# Patient Record
Sex: FEMALE | Race: WHITE | NOT HISPANIC OR LATINO | Employment: OTHER | URBAN - NONMETROPOLITAN AREA
[De-identification: names, ages, dates, MRNs, and addresses within clinical notes are randomized per-mention and may not be internally consistent; named-entity substitution may affect disease eponyms.]

---

## 2017-03-21 ENCOUNTER — TRANSCRIBE ORDERS (OUTPATIENT)
Dept: ADMINISTRATIVE | Facility: HOSPITAL | Age: 67
End: 2017-03-21

## 2017-03-21 DIAGNOSIS — Z12.31 VISIT FOR SCREENING MAMMOGRAM: Primary | ICD-10-CM

## 2017-04-03 ENCOUNTER — HOSPITAL ENCOUNTER (OUTPATIENT)
Dept: MAMMOGRAPHY | Facility: HOSPITAL | Age: 67
Discharge: HOME | End: 2017-04-03
Attending: INTERNAL MEDICINE | Admitting: INTERNAL MEDICINE

## 2017-04-03 DIAGNOSIS — Z12.31 VISIT FOR SCREENING MAMMOGRAM: ICD-10-CM

## 2017-04-03 PROCEDURE — 77063 BREAST TOMOSYNTHESIS BI: CPT | Performed by: RADIOLOGY

## 2017-04-03 PROCEDURE — 77063 BREAST TOMOSYNTHESIS BI: CPT

## 2017-04-03 PROCEDURE — G0202 SCR MAMMO BI INCL CAD: HCPCS

## 2017-04-03 PROCEDURE — G0202 SCR MAMMO BI INCL CAD: HCPCS | Performed by: RADIOLOGY

## 2017-09-15 ENCOUNTER — HOSPITAL ENCOUNTER (OUTPATIENT)
Dept: RESPIRATORY THERAPY | Facility: HOSPITAL | Age: 67
Discharge: HOME | End: 2017-09-15
Attending: INTERNAL MEDICINE

## 2017-09-15 ENCOUNTER — HOSPITAL ENCOUNTER (OUTPATIENT)
Dept: GENERAL RADIOLOGY | Facility: HOSPITAL | Age: 67
Discharge: HOME | End: 2017-09-15
Attending: INTERNAL MEDICINE | Admitting: INTERNAL MEDICINE

## 2017-09-15 ENCOUNTER — LAB (OUTPATIENT)
Dept: LAB | Facility: HOSPITAL | Age: 67
End: 2017-09-15
Attending: INTERNAL MEDICINE

## 2017-09-15 ENCOUNTER — TRANSCRIBE ORDERS (OUTPATIENT)
Dept: ADMINISTRATIVE | Facility: HOSPITAL | Age: 67
End: 2017-09-15

## 2017-09-15 DIAGNOSIS — Z01.818 PREOP EXAMINATION: ICD-10-CM

## 2017-09-15 DIAGNOSIS — Z01.818 PREOP EXAMINATION: Primary | ICD-10-CM

## 2017-09-15 LAB
ALBUMIN SERPL-MCNC: 4.4 G/DL (ref 3.4–4.8)
ALBUMIN/GLOB SERPL: 1.8 G/DL (ref 1.5–2.5)
ALP SERPL-CCNC: 146 U/L (ref 35–104)
ALT SERPL W P-5'-P-CCNC: 21 U/L (ref 10–36)
ANION GAP SERPL CALCULATED.3IONS-SCNC: 6.4 MMOL/L (ref 3.6–11.2)
AST SERPL-CCNC: 19 U/L (ref 10–30)
BASOPHILS # BLD AUTO: 0.04 10*3/MM3 (ref 0–0.3)
BASOPHILS NFR BLD AUTO: 0.7 % (ref 0–2)
BILIRUB SERPL-MCNC: 0.3 MG/DL (ref 0.2–1.8)
BUN BLD-MCNC: 11 MG/DL (ref 7–21)
BUN/CREAT SERPL: 14.3 (ref 7–25)
CALCIUM SPEC-SCNC: 9.1 MG/DL (ref 7.7–10)
CHLORIDE SERPL-SCNC: 111 MMOL/L (ref 99–112)
CO2 SERPL-SCNC: 25.6 MMOL/L (ref 24.3–31.9)
CREAT BLD-MCNC: 0.77 MG/DL (ref 0.43–1.29)
DEPRECATED RDW RBC AUTO: 47.2 FL (ref 37–54)
EOSINOPHIL # BLD AUTO: 0.15 10*3/MM3 (ref 0–0.7)
EOSINOPHIL NFR BLD AUTO: 2.6 % (ref 0–7)
ERYTHROCYTE [DISTWIDTH] IN BLOOD BY AUTOMATED COUNT: 15.7 % (ref 11.5–14.5)
GFR SERPL CREATININE-BSD FRML MDRD: 75 ML/MIN/1.73
GLOBULIN UR ELPH-MCNC: 2.4 GM/DL
GLUCOSE BLD-MCNC: 85 MG/DL (ref 70–110)
HCT VFR BLD AUTO: 36.6 % (ref 37–47)
HGB BLD-MCNC: 11.5 G/DL (ref 12–16)
IMM GRANULOCYTES # BLD: 0.01 10*3/MM3 (ref 0–0.03)
IMM GRANULOCYTES NFR BLD: 0.2 % (ref 0–0.5)
LYMPHOCYTES # BLD AUTO: 1.78 10*3/MM3 (ref 1–3)
LYMPHOCYTES NFR BLD AUTO: 30.8 % (ref 16–46)
MCH RBC QN AUTO: 25.9 PG (ref 27–33)
MCHC RBC AUTO-ENTMCNC: 31.4 G/DL (ref 33–37)
MCV RBC AUTO: 82.4 FL (ref 80–94)
MONOCYTES # BLD AUTO: 0.39 10*3/MM3 (ref 0.1–0.9)
MONOCYTES NFR BLD AUTO: 6.7 % (ref 0–12)
NEUTROPHILS # BLD AUTO: 3.41 10*3/MM3 (ref 1.4–6.5)
NEUTROPHILS NFR BLD AUTO: 59 % (ref 40–75)
OSMOLALITY SERPL CALC.SUM OF ELEC: 283.6 MOSM/KG (ref 273–305)
PLATELET # BLD AUTO: 255 10*3/MM3 (ref 130–400)
PMV BLD AUTO: 9.8 FL (ref 6–10)
POTASSIUM BLD-SCNC: 4.2 MMOL/L (ref 3.5–5.3)
PROT SERPL-MCNC: 6.8 G/DL (ref 6–8)
RBC # BLD AUTO: 4.44 10*6/MM3 (ref 4.2–5.4)
SODIUM BLD-SCNC: 143 MMOL/L (ref 135–153)
WBC NRBC COR # BLD: 5.78 10*3/MM3 (ref 4.5–12.5)

## 2017-09-15 PROCEDURE — 36415 COLL VENOUS BLD VENIPUNCTURE: CPT

## 2017-09-15 PROCEDURE — 93010 ELECTROCARDIOGRAM REPORT: CPT | Performed by: INTERNAL MEDICINE

## 2017-09-15 PROCEDURE — 80053 COMPREHEN METABOLIC PANEL: CPT | Performed by: INTERNAL MEDICINE

## 2017-09-15 PROCEDURE — 71020 HC CHEST PA AND LATERAL: CPT

## 2017-09-15 PROCEDURE — 71020 XR CHEST PA AND LATERAL: CPT | Performed by: RADIOLOGY

## 2017-09-15 PROCEDURE — 85025 COMPLETE CBC W/AUTO DIFF WBC: CPT | Performed by: INTERNAL MEDICINE

## 2017-09-15 PROCEDURE — 93005 ELECTROCARDIOGRAM TRACING: CPT | Performed by: INTERNAL MEDICINE

## 2017-09-22 ENCOUNTER — HOSPITAL ENCOUNTER (OUTPATIENT)
Dept: GENERAL RADIOLOGY | Facility: HOSPITAL | Age: 67
Discharge: HOME | End: 2017-09-22
Attending: INTERNAL MEDICINE | Admitting: INTERNAL MEDICINE

## 2017-09-22 ENCOUNTER — TRANSCRIBE ORDERS (OUTPATIENT)
Dept: ADMINISTRATIVE | Facility: HOSPITAL | Age: 67
End: 2017-09-22

## 2017-09-22 DIAGNOSIS — J18.9 UNRESOLVED PNEUMONIA: Primary | ICD-10-CM

## 2017-09-22 DIAGNOSIS — J18.9 UNRESOLVED PNEUMONIA: ICD-10-CM

## 2017-09-22 PROCEDURE — 71020 HC CHEST PA AND LATERAL: CPT

## 2017-09-22 PROCEDURE — 71020 XR CHEST PA AND LATERAL: CPT | Performed by: RADIOLOGY

## 2017-10-14 ENCOUNTER — TRANSCRIBE ORDERS (OUTPATIENT)
Dept: LAB | Facility: HOSPITAL | Age: 67
End: 2017-10-14

## 2017-10-14 ENCOUNTER — LAB (OUTPATIENT)
Dept: LAB | Facility: HOSPITAL | Age: 67
End: 2017-10-14

## 2017-10-14 DIAGNOSIS — Z79.899 NEED FOR PROPHYLACTIC CHEMOTHERAPY: ICD-10-CM

## 2017-10-14 DIAGNOSIS — R42 DIZZINESS AND GIDDINESS: Primary | ICD-10-CM

## 2017-10-14 DIAGNOSIS — R42 DIZZINESS AND GIDDINESS: ICD-10-CM

## 2017-10-14 LAB
ALBUMIN SERPL-MCNC: 4.6 G/DL (ref 3.4–4.8)
ALBUMIN/GLOB SERPL: 1.5 G/DL (ref 1.5–2.5)
ALP SERPL-CCNC: 180 U/L (ref 35–104)
ALT SERPL W P-5'-P-CCNC: 17 U/L (ref 10–36)
ANION GAP SERPL CALCULATED.3IONS-SCNC: 6.1 MMOL/L (ref 3.6–11.2)
AST SERPL-CCNC: 17 U/L (ref 10–30)
BASOPHILS # BLD AUTO: 0.04 10*3/MM3 (ref 0–0.3)
BASOPHILS NFR BLD AUTO: 0.6 % (ref 0–2)
BILIRUB SERPL-MCNC: 0.3 MG/DL (ref 0.2–1.8)
BUN BLD-MCNC: 12 MG/DL (ref 7–21)
BUN/CREAT SERPL: 12.9 (ref 7–25)
CALCIUM SPEC-SCNC: 9.4 MG/DL (ref 7.7–10)
CHLORIDE SERPL-SCNC: 105 MMOL/L (ref 99–112)
CO2 SERPL-SCNC: 26.9 MMOL/L (ref 24.3–31.9)
CREAT BLD-MCNC: 0.93 MG/DL (ref 0.43–1.29)
DEPRECATED RDW RBC AUTO: 44.1 FL (ref 37–54)
EOSINOPHIL # BLD AUTO: 0.15 10*3/MM3 (ref 0–0.7)
EOSINOPHIL NFR BLD AUTO: 2.1 % (ref 0–7)
ERYTHROCYTE [DISTWIDTH] IN BLOOD BY AUTOMATED COUNT: 14.7 % (ref 11.5–14.5)
GFR SERPL CREATININE-BSD FRML MDRD: 60 ML/MIN/1.73
GLOBULIN UR ELPH-MCNC: 3 GM/DL
GLUCOSE BLD-MCNC: 116 MG/DL (ref 70–110)
HCT VFR BLD AUTO: 41.8 % (ref 37–47)
HGB BLD-MCNC: 13.4 G/DL (ref 12–16)
IMM GRANULOCYTES # BLD: 0.02 10*3/MM3 (ref 0–0.03)
IMM GRANULOCYTES NFR BLD: 0.3 % (ref 0–0.5)
LYMPHOCYTES # BLD AUTO: 2.61 10*3/MM3 (ref 1–3)
LYMPHOCYTES NFR BLD AUTO: 36 % (ref 16–46)
MCH RBC QN AUTO: 26.5 PG (ref 27–33)
MCHC RBC AUTO-ENTMCNC: 32.1 G/DL (ref 33–37)
MCV RBC AUTO: 82.8 FL (ref 80–94)
MONOCYTES # BLD AUTO: 0.52 10*3/MM3 (ref 0.1–0.9)
MONOCYTES NFR BLD AUTO: 7.2 % (ref 0–12)
NEUTROPHILS # BLD AUTO: 3.9 10*3/MM3 (ref 1.4–6.5)
NEUTROPHILS NFR BLD AUTO: 53.8 % (ref 40–75)
OSMOLALITY SERPL CALC.SUM OF ELEC: 276.4 MOSM/KG (ref 273–305)
PLATELET # BLD AUTO: 286 10*3/MM3 (ref 130–400)
PMV BLD AUTO: 9.7 FL (ref 6–10)
POTASSIUM BLD-SCNC: 3.7 MMOL/L (ref 3.5–5.3)
PROT SERPL-MCNC: 7.6 G/DL (ref 6–8)
RBC # BLD AUTO: 5.05 10*6/MM3 (ref 4.2–5.4)
SODIUM BLD-SCNC: 138 MMOL/L (ref 135–153)
WBC NRBC COR # BLD: 7.24 10*3/MM3 (ref 4.5–12.5)

## 2017-10-14 PROCEDURE — 85025 COMPLETE CBC W/AUTO DIFF WBC: CPT | Performed by: NURSE PRACTITIONER

## 2017-10-14 PROCEDURE — 80053 COMPREHEN METABOLIC PANEL: CPT | Performed by: NURSE PRACTITIONER

## 2017-10-14 PROCEDURE — 36415 COLL VENOUS BLD VENIPUNCTURE: CPT

## 2018-05-17 ENCOUNTER — TRANSCRIBE ORDERS (OUTPATIENT)
Dept: PULMONOLOGY | Facility: HOSPITAL | Age: 68
End: 2018-05-17

## 2018-05-17 DIAGNOSIS — R11.2 NAUSEA AND VOMITING, INTRACTABILITY OF VOMITING NOT SPECIFIED, UNSPECIFIED VOMITING TYPE: ICD-10-CM

## 2018-05-17 DIAGNOSIS — R10.9 ABDOMINAL PAIN OF UNKNOWN ETIOLOGY: Primary | ICD-10-CM

## 2018-05-18 ENCOUNTER — HOSPITAL ENCOUNTER (OUTPATIENT)
Dept: ULTRASOUND IMAGING | Facility: HOSPITAL | Age: 68
Discharge: HOME | End: 2018-05-18
Admitting: INTERNAL MEDICINE

## 2018-05-18 DIAGNOSIS — R10.9 ABDOMINAL PAIN OF UNKNOWN ETIOLOGY: ICD-10-CM

## 2018-05-18 DIAGNOSIS — R11.2 NAUSEA AND VOMITING, INTRACTABILITY OF VOMITING NOT SPECIFIED, UNSPECIFIED VOMITING TYPE: ICD-10-CM

## 2018-05-18 PROCEDURE — 76705 ECHO EXAM OF ABDOMEN: CPT

## 2018-05-18 PROCEDURE — 76705 ECHO EXAM OF ABDOMEN: CPT | Performed by: RADIOLOGY

## 2018-05-23 ENCOUNTER — TRANSCRIBE ORDERS (OUTPATIENT)
Dept: PULMONOLOGY | Facility: HOSPITAL | Age: 68
End: 2018-05-23

## 2018-05-23 DIAGNOSIS — K76.9 LIVER LESION: Primary | ICD-10-CM

## 2018-06-06 ENCOUNTER — HOSPITAL ENCOUNTER (OUTPATIENT)
Dept: CT IMAGING | Facility: HOSPITAL | Age: 68
Discharge: HOME | End: 2018-06-06
Admitting: INTERNAL MEDICINE

## 2018-06-06 DIAGNOSIS — K76.9 LIVER LESION: ICD-10-CM

## 2018-06-06 LAB — CREAT BLDA-MCNC: 0.9 MG/DL (ref 0.6–1.3)

## 2018-06-06 PROCEDURE — 25010000002 IOPAMIDOL 61 % SOLUTION: Performed by: INTERNAL MEDICINE

## 2018-06-06 PROCEDURE — 74170 CT ABD WO CNTRST FLWD CNTRST: CPT

## 2018-06-06 PROCEDURE — 74170 CT ABD WO CNTRST FLWD CNTRST: CPT | Performed by: RADIOLOGY

## 2018-06-06 PROCEDURE — 82565 ASSAY OF CREATININE: CPT

## 2018-06-06 RX ADMIN — IOPAMIDOL 100 ML: 612 INJECTION, SOLUTION INTRAVENOUS at 10:45

## 2018-11-01 ENCOUNTER — HOSPITAL ENCOUNTER (OUTPATIENT)
Dept: MAMMOGRAPHY | Facility: HOSPITAL | Age: 68
Discharge: HOME | End: 2018-11-01
Admitting: INTERNAL MEDICINE

## 2018-11-01 DIAGNOSIS — Z12.39 SCREENING BREAST EXAMINATION: ICD-10-CM

## 2018-11-01 PROCEDURE — 77063 BREAST TOMOSYNTHESIS BI: CPT

## 2018-11-01 PROCEDURE — 77067 SCR MAMMO BI INCL CAD: CPT

## 2018-11-01 PROCEDURE — 77063 BREAST TOMOSYNTHESIS BI: CPT | Performed by: RADIOLOGY

## 2018-11-01 PROCEDURE — 77067 SCR MAMMO BI INCL CAD: CPT | Performed by: RADIOLOGY

## 2020-09-18 ENCOUNTER — HOSPITAL ENCOUNTER (OUTPATIENT)
Dept: BONE DENSITY | Facility: HOSPITAL | Age: 70
Discharge: HOME | End: 2020-09-18

## 2020-09-18 ENCOUNTER — HOSPITAL ENCOUNTER (OUTPATIENT)
Dept: MAMMOGRAPHY | Facility: HOSPITAL | Age: 70
Discharge: HOME | End: 2020-09-18

## 2020-09-18 DIAGNOSIS — Z78.0 POSTMENOPAUSAL: ICD-10-CM

## 2020-09-18 DIAGNOSIS — Z12.31 VISIT FOR SCREENING MAMMOGRAM: ICD-10-CM

## 2020-09-18 PROCEDURE — 77063 BREAST TOMOSYNTHESIS BI: CPT

## 2020-09-18 PROCEDURE — 77080 DXA BONE DENSITY AXIAL: CPT | Performed by: RADIOLOGY

## 2020-09-18 PROCEDURE — 77067 SCR MAMMO BI INCL CAD: CPT | Performed by: RADIOLOGY

## 2020-09-18 PROCEDURE — 77080 DXA BONE DENSITY AXIAL: CPT

## 2020-09-18 PROCEDURE — 77063 BREAST TOMOSYNTHESIS BI: CPT | Performed by: RADIOLOGY

## 2020-09-18 PROCEDURE — 77067 SCR MAMMO BI INCL CAD: CPT

## 2021-01-12 ENCOUNTER — IMMUNIZATION (OUTPATIENT)
Dept: VACCINE CLINIC | Facility: HOSPITAL | Age: 71
End: 2021-01-12

## 2021-01-12 PROCEDURE — 91300 HC SARSCOV02 VAC 30MCG/0.3ML IM: CPT | Performed by: FAMILY MEDICINE

## 2021-01-12 PROCEDURE — 0001A: CPT | Performed by: FAMILY MEDICINE

## 2021-02-02 ENCOUNTER — IMMUNIZATION (OUTPATIENT)
Dept: VACCINE CLINIC | Facility: HOSPITAL | Age: 71
End: 2021-02-02

## 2021-02-02 PROCEDURE — 91300 HC SARSCOV02 VAC 30MCG/0.3ML IM: CPT | Performed by: FAMILY MEDICINE

## 2021-02-02 PROCEDURE — 0002A: CPT | Performed by: FAMILY MEDICINE

## 2021-10-01 ENCOUNTER — HOSPITAL ENCOUNTER (OUTPATIENT)
Dept: MAMMOGRAPHY | Facility: HOSPITAL | Age: 71
Discharge: HOME | End: 2021-10-01
Admitting: INTERNAL MEDICINE

## 2021-10-01 DIAGNOSIS — Z12.31 VISIT FOR SCREENING MAMMOGRAM: ICD-10-CM

## 2021-10-01 PROCEDURE — 77063 BREAST TOMOSYNTHESIS BI: CPT | Performed by: RADIOLOGY

## 2021-10-01 PROCEDURE — 77067 SCR MAMMO BI INCL CAD: CPT | Performed by: RADIOLOGY

## 2021-10-01 PROCEDURE — 77067 SCR MAMMO BI INCL CAD: CPT

## 2021-10-01 PROCEDURE — 77063 BREAST TOMOSYNTHESIS BI: CPT

## 2022-09-07 ENCOUNTER — TRANSCRIBE ORDERS (OUTPATIENT)
Dept: ADMINISTRATIVE | Facility: HOSPITAL | Age: 72
End: 2022-09-07

## 2022-09-07 DIAGNOSIS — M54.9 BACK PAIN WITH RADIATION: Primary | ICD-10-CM

## 2022-09-23 ENCOUNTER — HOSPITAL ENCOUNTER (OUTPATIENT)
Dept: MRI IMAGING | Facility: HOSPITAL | Age: 72
Discharge: HOME | End: 2022-09-23
Admitting: INTERNAL MEDICINE

## 2022-09-23 DIAGNOSIS — M54.9 BACK PAIN WITH RADIATION: ICD-10-CM

## 2022-09-23 PROCEDURE — 72148 MRI LUMBAR SPINE W/O DYE: CPT | Performed by: RADIOLOGY

## 2022-09-23 PROCEDURE — 72148 MRI LUMBAR SPINE W/O DYE: CPT

## 2022-12-02 ENCOUNTER — APPOINTMENT (OUTPATIENT)
Dept: MAMMOGRAPHY | Facility: HOSPITAL | Age: 72
End: 2022-12-02

## 2023-01-06 ENCOUNTER — HOSPITAL ENCOUNTER (OUTPATIENT)
Dept: MAMMOGRAPHY | Facility: HOSPITAL | Age: 73
Discharge: HOME | End: 2023-01-06
Admitting: INTERNAL MEDICINE
Payer: MEDICARE

## 2023-01-06 DIAGNOSIS — Z12.31 VISIT FOR SCREENING MAMMOGRAM: ICD-10-CM

## 2023-01-06 PROCEDURE — 77063 BREAST TOMOSYNTHESIS BI: CPT

## 2023-01-06 PROCEDURE — 77063 BREAST TOMOSYNTHESIS BI: CPT | Performed by: RADIOLOGY

## 2023-01-06 PROCEDURE — 77067 SCR MAMMO BI INCL CAD: CPT

## 2023-01-06 PROCEDURE — 77067 SCR MAMMO BI INCL CAD: CPT | Performed by: RADIOLOGY

## 2024-02-26 ENCOUNTER — HOSPITAL ENCOUNTER (OUTPATIENT)
Dept: MAMMOGRAPHY | Facility: HOSPITAL | Age: 74
Discharge: HOME | End: 2024-02-26
Admitting: INTERNAL MEDICINE
Payer: MEDICARE

## 2024-02-26 DIAGNOSIS — Z12.31 VISIT FOR SCREENING MAMMOGRAM: ICD-10-CM

## 2024-02-26 PROCEDURE — 77067 SCR MAMMO BI INCL CAD: CPT | Performed by: RADIOLOGY

## 2024-02-26 PROCEDURE — 77067 SCR MAMMO BI INCL CAD: CPT

## 2024-02-26 PROCEDURE — 77063 BREAST TOMOSYNTHESIS BI: CPT | Performed by: RADIOLOGY

## 2024-02-26 PROCEDURE — 77063 BREAST TOMOSYNTHESIS BI: CPT

## 2024-03-05 ENCOUNTER — TRANSCRIBE ORDERS (OUTPATIENT)
Dept: ADMINISTRATIVE | Facility: HOSPITAL | Age: 74
End: 2024-03-05
Payer: COMMERCIAL

## 2024-03-05 DIAGNOSIS — Z78.0 POSTMENOPAUSAL: Primary | ICD-10-CM

## 2024-03-21 ENCOUNTER — HOSPITAL ENCOUNTER (OUTPATIENT)
Dept: ULTRASOUND IMAGING | Facility: HOSPITAL | Age: 74
Discharge: HOME | End: 2024-03-21
Payer: MEDICARE

## 2024-03-21 ENCOUNTER — HOSPITAL ENCOUNTER (OUTPATIENT)
Dept: MAMMOGRAPHY | Facility: HOSPITAL | Age: 74
Discharge: HOME | End: 2024-03-21
Payer: MEDICARE

## 2024-03-21 DIAGNOSIS — R92.8 ABNORMAL MAMMOGRAM OF LEFT BREAST: ICD-10-CM

## 2024-03-21 PROCEDURE — 77065 DX MAMMO INCL CAD UNI: CPT

## 2024-03-21 PROCEDURE — G0279 TOMOSYNTHESIS, MAMMO: HCPCS

## 2024-11-13 ENCOUNTER — APPOINTMENT (OUTPATIENT)
Dept: CT IMAGING | Facility: HOSPITAL | Age: 74
End: 2024-11-13
Payer: MEDICARE

## 2024-11-13 ENCOUNTER — HOSPITAL ENCOUNTER (EMERGENCY)
Facility: HOSPITAL | Age: 74
Discharge: HOME | End: 2024-11-13
Attending: EMERGENCY MEDICINE | Admitting: EMERGENCY MEDICINE
Payer: MEDICARE

## 2024-11-13 VITALS
WEIGHT: 160 LBS | HEIGHT: 62 IN | DIASTOLIC BLOOD PRESSURE: 79 MMHG | SYSTOLIC BLOOD PRESSURE: 153 MMHG | TEMPERATURE: 98.1 F | HEART RATE: 78 BPM | RESPIRATION RATE: 18 BRPM | BODY MASS INDEX: 29.44 KG/M2 | OXYGEN SATURATION: 97 %

## 2024-11-13 DIAGNOSIS — R51.9 ACUTE NONINTRACTABLE HEADACHE, UNSPECIFIED HEADACHE TYPE: Primary | ICD-10-CM

## 2024-11-13 LAB
ALBUMIN SERPL-MCNC: 4.6 G/DL (ref 3.5–5.2)
ALBUMIN/GLOB SERPL: 1.4 G/DL
ALP SERPL-CCNC: 128 U/L (ref 39–117)
ALT SERPL W P-5'-P-CCNC: 6 U/L (ref 1–33)
ANION GAP SERPL CALCULATED.3IONS-SCNC: 11.1 MMOL/L (ref 5–15)
AST SERPL-CCNC: 12 U/L (ref 1–32)
BASOPHILS # BLD AUTO: 0.06 10*3/MM3 (ref 0–0.2)
BASOPHILS NFR BLD AUTO: 0.9 % (ref 0–1.5)
BILIRUB SERPL-MCNC: <0.2 MG/DL (ref 0–1.2)
BUN SERPL-MCNC: 11 MG/DL (ref 8–23)
BUN/CREAT SERPL: 11 (ref 7–25)
CALCIUM SPEC-SCNC: 9.4 MG/DL (ref 8.6–10.5)
CHLORIDE SERPL-SCNC: 108 MMOL/L (ref 98–107)
CO2 SERPL-SCNC: 23.9 MMOL/L (ref 22–29)
CREAT SERPL-MCNC: 1 MG/DL (ref 0.57–1)
DEPRECATED RDW RBC AUTO: 47.4 FL (ref 37–54)
EGFRCR SERPLBLD CKD-EPI 2021: 59.6 ML/MIN/1.73
EOSINOPHIL # BLD AUTO: 0.13 10*3/MM3 (ref 0–0.4)
EOSINOPHIL NFR BLD AUTO: 1.9 % (ref 0.3–6.2)
ERYTHROCYTE [DISTWIDTH] IN BLOOD BY AUTOMATED COUNT: 17.3 % (ref 12.3–15.4)
GLOBULIN UR ELPH-MCNC: 3.3 GM/DL
GLUCOSE SERPL-MCNC: 87 MG/DL (ref 65–99)
HCT VFR BLD AUTO: 36.6 % (ref 34–46.6)
HGB BLD-MCNC: 10.7 G/DL (ref 12–15.9)
HOLD SPECIMEN: NORMAL
HOLD SPECIMEN: NORMAL
IMM GRANULOCYTES # BLD AUTO: 0.02 10*3/MM3 (ref 0–0.05)
IMM GRANULOCYTES NFR BLD AUTO: 0.3 % (ref 0–0.5)
LYMPHOCYTES # BLD AUTO: 2.81 10*3/MM3 (ref 0.7–3.1)
LYMPHOCYTES NFR BLD AUTO: 40.5 % (ref 19.6–45.3)
MCH RBC QN AUTO: 22.2 PG (ref 26.6–33)
MCHC RBC AUTO-ENTMCNC: 29.2 G/DL (ref 31.5–35.7)
MCV RBC AUTO: 75.8 FL (ref 79–97)
MONOCYTES # BLD AUTO: 0.46 10*3/MM3 (ref 0.1–0.9)
MONOCYTES NFR BLD AUTO: 6.6 % (ref 5–12)
NEUTROPHILS NFR BLD AUTO: 3.46 10*3/MM3 (ref 1.7–7)
NEUTROPHILS NFR BLD AUTO: 49.8 % (ref 42.7–76)
NRBC BLD AUTO-RTO: 0 /100 WBC (ref 0–0.2)
PLATELET # BLD AUTO: 364 10*3/MM3 (ref 140–450)
PMV BLD AUTO: 9.8 FL (ref 6–12)
POTASSIUM SERPL-SCNC: 4.2 MMOL/L (ref 3.5–5.2)
PROT SERPL-MCNC: 7.9 G/DL (ref 6–8.5)
RBC # BLD AUTO: 4.83 10*6/MM3 (ref 3.77–5.28)
SODIUM SERPL-SCNC: 143 MMOL/L (ref 136–145)
WBC NRBC COR # BLD AUTO: 6.94 10*3/MM3 (ref 3.4–10.8)
WHOLE BLOOD HOLD COAG: NORMAL
WHOLE BLOOD HOLD SPECIMEN: NORMAL

## 2024-11-13 PROCEDURE — 99284 EMERGENCY DEPT VISIT MOD MDM: CPT

## 2024-11-13 PROCEDURE — 63710000001 ONDANSETRON ODT 4 MG TABLET DISPERSIBLE: Performed by: NURSE PRACTITIONER

## 2024-11-13 PROCEDURE — 80053 COMPREHEN METABOLIC PANEL: CPT | Performed by: EMERGENCY MEDICINE

## 2024-11-13 PROCEDURE — 85025 COMPLETE CBC W/AUTO DIFF WBC: CPT | Performed by: EMERGENCY MEDICINE

## 2024-11-13 PROCEDURE — 25010000002 HYDROMORPHONE PER 4 MG: Performed by: NURSE PRACTITIONER

## 2024-11-13 PROCEDURE — 25010000002 HYDRALAZINE PER 20 MG: Performed by: NURSE PRACTITIONER

## 2024-11-13 PROCEDURE — 36415 COLL VENOUS BLD VENIPUNCTURE: CPT

## 2024-11-13 PROCEDURE — 96374 THER/PROPH/DIAG INJ IV PUSH: CPT

## 2024-11-13 PROCEDURE — 96375 TX/PRO/DX INJ NEW DRUG ADDON: CPT

## 2024-11-13 PROCEDURE — 25010000002 KETOROLAC TROMETHAMINE PER 15 MG: Performed by: NURSE PRACTITIONER

## 2024-11-13 PROCEDURE — 70450 CT HEAD/BRAIN W/O DYE: CPT | Performed by: RADIOLOGY

## 2024-11-13 PROCEDURE — 70450 CT HEAD/BRAIN W/O DYE: CPT

## 2024-11-13 RX ORDER — ONDANSETRON 4 MG/1
4 TABLET, ORALLY DISINTEGRATING ORAL ONCE
Status: COMPLETED | OUTPATIENT
Start: 2024-11-13 | End: 2024-11-13

## 2024-11-13 RX ORDER — HYDRALAZINE HYDROCHLORIDE 20 MG/ML
10 INJECTION INTRAMUSCULAR; INTRAVENOUS ONCE
Status: COMPLETED | OUTPATIENT
Start: 2024-11-13 | End: 2024-11-13

## 2024-11-13 RX ORDER — CLONIDINE HYDROCHLORIDE 0.1 MG/1
0.1 TABLET ORAL ONCE
Status: COMPLETED | OUTPATIENT
Start: 2024-11-13 | End: 2024-11-13

## 2024-11-13 RX ORDER — KETOROLAC TROMETHAMINE 30 MG/ML
15 INJECTION, SOLUTION INTRAMUSCULAR; INTRAVENOUS ONCE
Status: COMPLETED | OUTPATIENT
Start: 2024-11-13 | End: 2024-11-13

## 2024-11-13 RX ORDER — SODIUM CHLORIDE 0.9 % (FLUSH) 0.9 %
10 SYRINGE (ML) INJECTION AS NEEDED
Status: DISCONTINUED | OUTPATIENT
Start: 2024-11-13 | End: 2024-11-13 | Stop reason: HOSPADM

## 2024-11-13 RX ORDER — BUTALBITAL/ASPIRIN/CAFFEINE 50-325-40
1 CAPSULE ORAL EVERY 6 HOURS PRN
Qty: 10 CAPSULE | Refills: 0 | Status: SHIPPED | OUTPATIENT
Start: 2024-11-13

## 2024-11-13 RX ORDER — HYDROMORPHONE HYDROCHLORIDE 1 MG/ML
0.25 INJECTION, SOLUTION INTRAMUSCULAR; INTRAVENOUS; SUBCUTANEOUS ONCE
Status: COMPLETED | OUTPATIENT
Start: 2024-11-13 | End: 2024-11-13

## 2024-11-13 RX ORDER — CLONIDINE HYDROCHLORIDE 0.1 MG/1
0.1 TABLET ORAL 3 TIMES DAILY PRN
Qty: 15 TABLET | Refills: 0 | Status: SHIPPED | OUTPATIENT
Start: 2024-11-13

## 2024-11-13 RX ADMIN — CLONIDINE HYDROCHLORIDE 0.1 MG: 0.1 TABLET ORAL at 18:19

## 2024-11-13 RX ADMIN — KETOROLAC TROMETHAMINE 15 MG: 30 INJECTION, SOLUTION INTRAMUSCULAR; INTRAVENOUS at 17:06

## 2024-11-13 RX ADMIN — HYDROMORPHONE HYDROCHLORIDE 0.25 MG: 1 INJECTION, SOLUTION INTRAMUSCULAR; INTRAVENOUS; SUBCUTANEOUS at 19:43

## 2024-11-13 RX ADMIN — HYDRALAZINE HYDROCHLORIDE 10 MG: 20 INJECTION INTRAMUSCULAR; INTRAVENOUS at 19:03

## 2024-11-13 RX ADMIN — ONDANSETRON 4 MG: 4 TABLET, ORALLY DISINTEGRATING ORAL at 19:35

## 2024-11-13 NOTE — ED NOTES
Patient explained wait due to high patient volume and that she will be roomed as quickly as possible. Patient voices understanding. Patient reports she currently still has a 10/10 headache. Patient instructed to notify triage RN or ED staff of any needs or concerns. ASHVIN SANTA.

## 2024-11-13 NOTE — ED PROVIDER NOTES
Subjective   History of Present Illness  Patient is a 73-year-old female presents today complaining of a headache for 3 to 4 weeks.  Patient also reports of high blood pressure.  Patient reports she seen her primary care provider who wrote her clonidine and another medication and states that he accidentally sent it to the [facility] mail in pharmacy and she reports that she has been unable to get this.    Hypertension      Review of Systems   Constitutional: Negative.    HENT: Negative.     Eyes: Negative.    Respiratory: Negative.     Cardiovascular: Negative.    Gastrointestinal: Negative.    Endocrine: Negative.    Genitourinary: Negative.    Musculoskeletal: Negative.    Skin: Negative.    Allergic/Immunologic: Negative.    Neurological: Negative.    Hematological: Negative.    Psychiatric/Behavioral: Negative.         No past medical history on file.    Allergies   Allergen Reactions    Sulfa Antibiotics Shortness Of Breath       Past Surgical History:   Procedure Laterality Date    HYSTERECTOMY         Family History   Problem Relation Age of Onset    Breast cancer Sister     Breast cancer Sister        Social History     Socioeconomic History    Marital status:            Objective   Physical Exam  Vitals and nursing note reviewed.   Constitutional:       Appearance: She is well-developed.   HENT:      Head: Normocephalic.      Right Ear: External ear normal.      Left Ear: External ear normal.   Eyes:      Conjunctiva/sclera: Conjunctivae normal.      Pupils: Pupils are equal, round, and reactive to light.   Cardiovascular:      Rate and Rhythm: Normal rate and regular rhythm.      Heart sounds: Normal heart sounds.   Pulmonary:      Effort: Pulmonary effort is normal.      Breath sounds: Normal breath sounds.   Abdominal:      General: Bowel sounds are normal.      Palpations: Abdomen is soft.   Musculoskeletal:         General: Normal range of motion.      Cervical back: Normal range of motion and neck  supple.   Skin:     General: Skin is warm and dry.      Capillary Refill: Capillary refill takes less than 2 seconds.   Neurological:      Mental Status: She is alert and oriented to person, place, and time.   Psychiatric:         Behavior: Behavior normal.         Thought Content: Thought content normal.         Procedures       Results for orders placed or performed during the hospital encounter of 11/13/24   Comprehensive Metabolic Panel    Collection Time: 11/13/24  2:14 PM    Specimen: Arm, Right; Blood   Result Value Ref Range    Glucose 87 65 - 99 mg/dL    BUN 11 8 - 23 mg/dL    Creatinine 1.00 0.57 - 1.00 mg/dL    Sodium 143 136 - 145 mmol/L    Potassium 4.2 3.5 - 5.2 mmol/L    Chloride 108 (H) 98 - 107 mmol/L    CO2 23.9 22.0 - 29.0 mmol/L    Calcium 9.4 8.6 - 10.5 mg/dL    Total Protein 7.9 6.0 - 8.5 g/dL    Albumin 4.6 3.5 - 5.2 g/dL    ALT (SGPT) 6 1 - 33 U/L    AST (SGOT) 12 1 - 32 U/L    Alkaline Phosphatase 128 (H) 39 - 117 U/L    Total Bilirubin <0.2 0.0 - 1.2 mg/dL    Globulin 3.3 gm/dL    A/G Ratio 1.4 g/dL    BUN/Creatinine Ratio 11.0 7.0 - 25.0    Anion Gap 11.1 5.0 - 15.0 mmol/L    eGFR 59.6 (L) >60.0 mL/min/1.73   CBC Auto Differential    Collection Time: 11/13/24  2:14 PM    Specimen: Arm, Right; Blood   Result Value Ref Range    WBC 6.94 3.40 - 10.80 10*3/mm3    RBC 4.83 3.77 - 5.28 10*6/mm3    Hemoglobin 10.7 (L) 12.0 - 15.9 g/dL    Hematocrit 36.6 34.0 - 46.6 %    MCV 75.8 (L) 79.0 - 97.0 fL    MCH 22.2 (L) 26.6 - 33.0 pg    MCHC 29.2 (L) 31.5 - 35.7 g/dL    RDW 17.3 (H) 12.3 - 15.4 %    RDW-SD 47.4 37.0 - 54.0 fl    MPV 9.8 6.0 - 12.0 fL    Platelets 364 140 - 450 10*3/mm3    Neutrophil % 49.8 42.7 - 76.0 %    Lymphocyte % 40.5 19.6 - 45.3 %    Monocyte % 6.6 5.0 - 12.0 %    Eosinophil % 1.9 0.3 - 6.2 %    Basophil % 0.9 0.0 - 1.5 %    Immature Grans % 0.3 0.0 - 0.5 %    Neutrophils, Absolute 3.46 1.70 - 7.00 10*3/mm3    Lymphocytes, Absolute 2.81 0.70 - 3.10 10*3/mm3    Monocytes,  Absolute 0.46 0.10 - 0.90 10*3/mm3    Eosinophils, Absolute 0.13 0.00 - 0.40 10*3/mm3    Basophils, Absolute 0.06 0.00 - 0.20 10*3/mm3    Immature Grans, Absolute 0.02 0.00 - 0.05 10*3/mm3    nRBC 0.0 0.0 - 0.2 /100 WBC   Green Top (Gel)    Collection Time: 11/13/24  2:14 PM   Result Value Ref Range    Extra Tube Hold for add-ons.    Lavender Top    Collection Time: 11/13/24  2:14 PM   Result Value Ref Range    Extra Tube hold for add-on    Gold Top - SST    Collection Time: 11/13/24  2:14 PM   Result Value Ref Range    Extra Tube Hold for add-ons.    Light Blue Top    Collection Time: 11/13/24  2:14 PM   Result Value Ref Range    Extra Tube Hold for add-ons.      CT Head Without Contrast   Final Result   1.  No CT evidence acute intracranial abnormality.   2.  Subtotal opacification of the sphenoid sinuses with superimposed   air-fluid level consistent with acute-on-chronic sinusitis.   3.  Small air-fluid level left maxillary sinus consistent with mild   acute left maxillary sinusitis.           This report was finalized on 11/13/2024 5:02 PM by Dr. Rinku Thompson MD.          .      ED Course                    No data recorded                             Medical Decision Making  MDM:    Escalation of care including admission/observation considered    - Discussions of management with other providers:  None    - Discussed/reviewed with Radiology regarding test interpretation    - Independent interpretation: None    - Additional patient history obtained from: None    - Review of external non-ED record (if available):  Prior Inpt record, Office record, Outpt record, Prior Outpt labs, PCP record, Outside ED record, Other    - Chronic conditions affecting care: See HPI and medical Hx.    - Social Determinants of health significantly affecting care:  None        Medical Decision Making Discussion:    Patient is a 73-year-old female presents today complaining of a headache for 3 to 4 weeks.  Patient also reports of  high blood pressure.  Patient reports she seen her primary care provider who wrote her clonidine and another medication and states that he accidentally sent it to the [facility] mail in pharmacy and she reports that she has been unable to get this.      The patient has been given very strict return precautions to return to the emergency department should there be any acute change or worsening of their condition.  I have explained my findings and the patient has expressed understanding to me.  I explained that the work-up performed in the ED has been based on the specific complaint and concern, as the nature of emergency medicine is complaint driven and they understand that new symptoms may arise.  I have told them that, should there be any new symptoms, worsening or changing symptoms, a new work-up may be indicated that they are encouraged to return to the emergency department or promptly contact their primary care physician. We have employed a shared decision-making process as the discussion of their disposition.  The patient has been educated as to the nature of the visit, the tests and work-up performed and the findings from today's visit. At this time, there does not appear to be any acute emergent process that necessitates admission to the hospital, however, the patient understands that this can change unexpectedly. At this time, the patient is stable for discharge home and agrees to follow-up with her primary care physician in the next 24 to 48 hours or earlier should they be able to obtain an appointment.    The patient was counseled regarding diagnostic results and treatment plan and patient has indicated understanding of these instructions.      Problems Addressed:  Acute nonintractable headache, unspecified headache type: complicated acute illness or injury    Amount and/or Complexity of Data Reviewed  Labs: ordered. Decision-making details documented in ED Course.  Radiology: ordered. Decision-making details  documented in ED Course.    Risk  Prescription drug management.        Final diagnoses:   Acute nonintractable headache, unspecified headache type       ED Disposition  ED Disposition       ED Disposition   Discharge    Condition   Stable    Comment   --               Daniel Campos MD  [address] [facility] [address]  [place] [address]  [phone]    Schedule an appointment as soon as possible for a visit   For further evaluation         Medication List        New Prescriptions      butalbital-aspirin-caffeine -40 MG per capsule  Commonly known as: FIORINAL  Take 1 capsule by mouth Every 6 (Six) Hours As Needed for Headache.     cloNIDine 0.1 MG tablet  Commonly known as: CATAPRES  Take 1 tablet by mouth 3 (Three) Times a Day As Needed (blood pressure greater than 160/110).               Where to Get Your Medications        These medications were sent to [id number] - [place] - [address] [facility] [address] - [phone]  - [phone]   [address] [place] [address]      Phone: [phone]   butalbital-aspirin-caffeine -40 MG per capsule  cloNIDine 0.1 MG tablet            Geremias Damon P, APRN  11/13/24 1939

## 2024-11-14 NOTE — DISCHARGE INSTRUCTIONS
